# Patient Record
Sex: MALE | Race: WHITE | NOT HISPANIC OR LATINO
[De-identification: names, ages, dates, MRNs, and addresses within clinical notes are randomized per-mention and may not be internally consistent; named-entity substitution may affect disease eponyms.]

---

## 2017-04-07 ENCOUNTER — TRANSCRIPTION ENCOUNTER (OUTPATIENT)
Age: 37
End: 2017-04-07

## 2017-08-18 ENCOUNTER — EMERGENCY (EMERGENCY)
Facility: HOSPITAL | Age: 37
LOS: 1 days | Discharge: HOME | End: 2017-08-18
Attending: EMERGENCY MEDICINE
Payer: COMMERCIAL

## 2017-08-18 VITALS
TEMPERATURE: 98 F | DIASTOLIC BLOOD PRESSURE: 76 MMHG | HEART RATE: 56 BPM | SYSTOLIC BLOOD PRESSURE: 125 MMHG | RESPIRATION RATE: 18 BRPM | HEIGHT: 70 IN | WEIGHT: 199.96 LBS

## 2017-08-18 PROBLEM — Z00.00 ENCOUNTER FOR PREVENTIVE HEALTH EXAMINATION: Status: ACTIVE | Noted: 2017-08-18

## 2017-08-18 PROCEDURE — 99282 EMERGENCY DEPT VISIT SF MDM: CPT | Mod: 25

## 2017-08-18 PROCEDURE — 12011 RPR F/E/E/N/L/M 2.5 CM/<: CPT

## 2017-08-18 PROCEDURE — 99283 EMERGENCY DEPT VISIT LOW MDM: CPT | Mod: 25

## 2017-08-18 RX ORDER — TETANUS TOXOID, REDUCED DIPHTHERIA TOXOID AND ACELLULAR PERTUSSIS VACCINE, ADSORBED 5; 2.5; 8; 8; 2.5 [IU]/.5ML; [IU]/.5ML; UG/.5ML; UG/.5ML; UG/.5ML
0.5 SUSPENSION INTRAMUSCULAR ONCE
Qty: 0 | Refills: 0 | Status: DISCONTINUED | OUTPATIENT
Start: 2017-08-18 | End: 2017-08-18

## 2017-08-18 NOTE — ED STATDOCS - ATTENDING CONTRIBUTION TO CARE
I, Sandip Diallo, performed the initial face to face bedside interview with this patient regarding history of present illness, review of symptoms and relevant past medical, social and family history.  I completed an independent physical examination.  I was the initial provider who evaluated this patient. I have signed out the follow up of any pending tests (i.e. labs, radiological studies) to the ACP.  I have communicated the patient’s plan of care and disposition with the ACP.

## 2017-08-18 NOTE — ED STATDOCS - PROGRESS NOTE DETAILS
Lip laceration and repaired successfully. Pt tolerated procedure well. pt D/C in stable condition and will F/U for suture removal as discussed.

## 2017-08-18 NOTE — ED STATDOCS - CARE PLAN
Principal Discharge DX:	Lip laceration, initial encounter  Instructions for follow-up, activity and diet:	F/U for suture removal in 7 ays as discussed

## 2017-08-18 NOTE — ED STATDOCS - SKIN, MLM
laceration to the L upper lip crossing the vermilion border. 1.5 cm laceration to the L upper lip crossing the vermilion border.

## 2017-08-18 NOTE — ED STATDOCS - OBJECTIVE STATEMENT
35 y/o M w/ no significant PMHx presents to the ED c/o L upper lip laceration s/p cutting it with a splitting maul today. He received a tetanus shot within the last 10 years. Pt denies LOC or any other complaints at this time.

## 2017-09-04 ENCOUNTER — TRANSCRIPTION ENCOUNTER (OUTPATIENT)
Age: 37
End: 2017-09-04

## 2021-07-02 ENCOUNTER — TRANSCRIPTION ENCOUNTER (OUTPATIENT)
Age: 41
End: 2021-07-02

## 2022-02-17 ENCOUNTER — TRANSCRIPTION ENCOUNTER (OUTPATIENT)
Age: 42
End: 2022-02-17

## 2023-01-13 ENCOUNTER — NON-APPOINTMENT (OUTPATIENT)
Age: 43
End: 2023-01-13

## 2024-06-08 ENCOUNTER — EMERGENCY (EMERGENCY)
Facility: HOSPITAL | Age: 44
LOS: 1 days | Discharge: HOME | End: 2024-06-08
Attending: STUDENT IN AN ORGANIZED HEALTH CARE EDUCATION/TRAINING PROGRAM
Payer: COMMERCIAL

## 2024-06-08 VITALS
RESPIRATION RATE: 20 BRPM | WEIGHT: 200.62 LBS | SYSTOLIC BLOOD PRESSURE: 138 MMHG | HEIGHT: 70 IN | OXYGEN SATURATION: 98 % | DIASTOLIC BLOOD PRESSURE: 80 MMHG | HEART RATE: 77 BPM | TEMPERATURE: 98 F

## 2024-06-08 LAB
ALBUMIN SERPL ELPH-MCNC: 4.1 G/DL — SIGNIFICANT CHANGE UP (ref 3.3–5.2)
ALP SERPL-CCNC: 52 U/L — SIGNIFICANT CHANGE UP (ref 40–120)
ALT FLD-CCNC: 22 U/L — SIGNIFICANT CHANGE UP
ANION GAP SERPL CALC-SCNC: 14 MMOL/L — SIGNIFICANT CHANGE UP (ref 5–17)
AST SERPL-CCNC: 22 U/L — SIGNIFICANT CHANGE UP
BASOPHILS # BLD AUTO: 0.04 K/UL — SIGNIFICANT CHANGE UP (ref 0–0.2)
BASOPHILS NFR BLD AUTO: 0.4 % — SIGNIFICANT CHANGE UP (ref 0–2)
BILIRUB SERPL-MCNC: 0.6 MG/DL — SIGNIFICANT CHANGE UP (ref 0.4–2)
BUN SERPL-MCNC: 14.3 MG/DL — SIGNIFICANT CHANGE UP (ref 8–20)
CALCIUM SERPL-MCNC: 8.8 MG/DL — SIGNIFICANT CHANGE UP (ref 8.4–10.5)
CHLORIDE SERPL-SCNC: 100 MMOL/L — SIGNIFICANT CHANGE UP (ref 96–108)
CO2 SERPL-SCNC: 23 MMOL/L — SIGNIFICANT CHANGE UP (ref 22–29)
CREAT SERPL-MCNC: 0.75 MG/DL — SIGNIFICANT CHANGE UP (ref 0.5–1.3)
EGFR: 113 ML/MIN/1.73M2 — SIGNIFICANT CHANGE UP
EOSINOPHIL # BLD AUTO: 0.32 K/UL — SIGNIFICANT CHANGE UP (ref 0–0.5)
EOSINOPHIL NFR BLD AUTO: 2.8 % — SIGNIFICANT CHANGE UP (ref 0–6)
GLUCOSE SERPL-MCNC: 82 MG/DL — SIGNIFICANT CHANGE UP (ref 70–99)
HCT VFR BLD CALC: 39.1 % — SIGNIFICANT CHANGE UP (ref 39–50)
HGB BLD-MCNC: 14.2 G/DL — SIGNIFICANT CHANGE UP (ref 13–17)
IMM GRANULOCYTES NFR BLD AUTO: 0.2 % — SIGNIFICANT CHANGE UP (ref 0–0.9)
LYMPHOCYTES # BLD AUTO: 1.37 K/UL — SIGNIFICANT CHANGE UP (ref 1–3.3)
LYMPHOCYTES # BLD AUTO: 12.2 % — LOW (ref 13–44)
MCHC RBC-ENTMCNC: 31.6 PG — SIGNIFICANT CHANGE UP (ref 27–34)
MCHC RBC-ENTMCNC: 36.3 GM/DL — HIGH (ref 32–36)
MCV RBC AUTO: 86.9 FL — SIGNIFICANT CHANGE UP (ref 80–100)
MONOCYTES # BLD AUTO: 0.83 K/UL — SIGNIFICANT CHANGE UP (ref 0–0.9)
MONOCYTES NFR BLD AUTO: 7.4 % — SIGNIFICANT CHANGE UP (ref 2–14)
NEUTROPHILS # BLD AUTO: 8.68 K/UL — HIGH (ref 1.8–7.4)
NEUTROPHILS NFR BLD AUTO: 77 % — SIGNIFICANT CHANGE UP (ref 43–77)
PLATELET # BLD AUTO: 165 K/UL — SIGNIFICANT CHANGE UP (ref 150–400)
POTASSIUM SERPL-MCNC: 4.1 MMOL/L — SIGNIFICANT CHANGE UP (ref 3.5–5.3)
POTASSIUM SERPL-SCNC: 4.1 MMOL/L — SIGNIFICANT CHANGE UP (ref 3.5–5.3)
PROT SERPL-MCNC: 7 G/DL — SIGNIFICANT CHANGE UP (ref 6.6–8.7)
RBC # BLD: 4.5 M/UL — SIGNIFICANT CHANGE UP (ref 4.2–5.8)
RBC # FLD: 12.3 % — SIGNIFICANT CHANGE UP (ref 10.3–14.5)
SODIUM SERPL-SCNC: 137 MMOL/L — SIGNIFICANT CHANGE UP (ref 135–145)
WBC # BLD: 11.26 K/UL — HIGH (ref 3.8–10.5)
WBC # FLD AUTO: 11.26 K/UL — HIGH (ref 3.8–10.5)

## 2024-06-08 PROCEDURE — 73140 X-RAY EXAM OF FINGER(S): CPT | Mod: 26,RT

## 2024-06-08 PROCEDURE — 73130 X-RAY EXAM OF HAND: CPT | Mod: 26,RT

## 2024-06-08 PROCEDURE — 99223 1ST HOSP IP/OBS HIGH 75: CPT

## 2024-06-08 RX ORDER — CEFAZOLIN SODIUM 1 G
1000 VIAL (EA) INJECTION ONCE
Refills: 0 | Status: COMPLETED | OUTPATIENT
Start: 2024-06-08 | End: 2024-06-08

## 2024-06-08 RX ORDER — ACETAMINOPHEN 500 MG
650 TABLET ORAL EVERY 6 HOURS
Refills: 0 | Status: DISCONTINUED | OUTPATIENT
Start: 2024-06-08 | End: 2024-06-16

## 2024-06-08 RX ORDER — CEFAZOLIN SODIUM 1 G
1000 VIAL (EA) INJECTION ONCE
Refills: 0 | Status: DISCONTINUED | OUTPATIENT
Start: 2024-06-08 | End: 2024-06-08

## 2024-06-08 RX ORDER — CEFAZOLIN SODIUM 1 G
1000 VIAL (EA) INJECTION EVERY 8 HOURS
Refills: 0 | Status: DISCONTINUED | OUTPATIENT
Start: 2024-06-08 | End: 2024-06-09

## 2024-06-08 RX ORDER — DEXAMETHASONE 0.5 MG/5ML
10 ELIXIR ORAL ONCE
Refills: 0 | Status: DISCONTINUED | OUTPATIENT
Start: 2024-06-08 | End: 2024-06-08

## 2024-06-08 RX ORDER — KETOROLAC TROMETHAMINE 30 MG/ML
15 SYRINGE (ML) INJECTION EVERY 6 HOURS
Refills: 0 | Status: COMPLETED | OUTPATIENT
Start: 2024-06-08 | End: 2024-06-13

## 2024-06-08 RX ORDER — FAMOTIDINE 10 MG/ML
20 INJECTION INTRAVENOUS ONCE
Refills: 0 | Status: DISCONTINUED | OUTPATIENT
Start: 2024-06-08 | End: 2024-06-08

## 2024-06-08 RX ORDER — IBUPROFEN 200 MG
600 TABLET ORAL ONCE
Refills: 0 | Status: COMPLETED | OUTPATIENT
Start: 2024-06-08 | End: 2024-06-08

## 2024-06-08 RX ORDER — DIPHENHYDRAMINE HCL 50 MG
50 CAPSULE ORAL ONCE
Refills: 0 | Status: DISCONTINUED | OUTPATIENT
Start: 2024-06-08 | End: 2024-06-08

## 2024-06-08 RX ORDER — CEFAZOLIN SODIUM 1 G
1000 VIAL (EA) INJECTION EVERY 8 HOURS
Refills: 0 | Status: DISCONTINUED | OUTPATIENT
Start: 2024-06-08 | End: 2024-06-08

## 2024-06-08 RX ADMIN — Medication 600 MILLIGRAM(S): at 16:41

## 2024-06-08 RX ADMIN — Medication 600 MILLIGRAM(S): at 18:21

## 2024-06-08 RX ADMIN — Medication 50 MILLIGRAM(S): at 18:28

## 2024-06-08 RX ADMIN — Medication 100 MILLIGRAM(S): at 18:29

## 2024-06-08 RX ADMIN — Medication 1000 MILLIGRAM(S): at 22:29

## 2024-06-08 RX ADMIN — Medication 650 MILLIGRAM(S): at 18:28

## 2024-06-08 RX ADMIN — Medication 1000 MILLIGRAM(S): at 16:40

## 2024-06-08 RX ADMIN — Medication 100 MILLIGRAM(S): at 19:09

## 2024-06-08 NOTE — ED CDU PROVIDER INITIAL DAY NOTE - ATTENDING APP SHARED VISIT CONTRIBUTION OF CARE
I, Aurelio Ventura MD, performed the initial face to face bedside interview with this patient regarding history of present illness, review of symptoms and relevant past medical, social and family history.  I completed an independent physical examination.  I was the initial provider who evaluated this patient. I have signed out the follow up of any pending tests (i.e. labs, radiological studies) to the ACP.  I have communicated the patient’s plan of care and disposition with the ACP.

## 2024-06-08 NOTE — ED ADULT TRIAGE NOTE - CHIEF COMPLAINT QUOTE
Pt arrives ot ED c/o R hand thumb swelling / redness started on Thursday ,pt was placed on  Doxy on Friday , redness getting worse

## 2024-06-08 NOTE — CONSULT NOTE ADULT - SUBJECTIVE AND OBJECTIVE BOX
Pt Name: STEFANO HARRISON    MRN: [id number]      Patient is a 43y Male presenting to the emergency department with a chief complaint of right thumb erythema pain and swelling.  Patient states it began on Thursday on the palmar aspect of distal thumb and he went o urgent care and was given doxycycline  patient went to PCP the next day due to worsening pain and swelling.  PCP sent patient to ED for evaluation.      REVIEW OF SYSTEMS    General: Alert, responsive, in NAD    Skin: No rashes, no pruritis     Musculoskeletal: SEE HPI.    Neurological: No sensory or motor changes.         PAST MEDICAL & SURGICAL HISTORY:  PAST MEDICAL & SURGICAL HISTORY:  No pertinent past medical history      No significant past surgical history          Allergies: No Known Allergies      Medications: acetaminophen     Tablet .. 650 milliGRAM(s) Oral every 6 hours PRN  ceFAZolin  Injectable. 1000 milliGRAM(s) IV Push every 8 hours  doxycycline IVPB 100 milliGRAM(s) IV Intermittent every 12 hours  ketorolac   Injectable 15 milliGRAM(s) IV Push every 6 hours  predniSONE   Tablet 50 milliGRAM(s) Oral daily      FAMILY HISTORY:  : non-contributory    Social History:     Ambulation: Walking independently [ ] With Cane [ ] With Walker [ ]  Bedbound [ ]                           14.2   11.26 )-----------( 165      ( 08 Jun 2024 16:10 )             39.1       06-08    137  |  100  |  14.3  ----------------------------<  82  4.1   |  23.0  |  0.75    Ca    8.8      08 Jun 2024 16:10    TPro  7.0  /  Alb  4.1  /  TBili  0.6  /  DBili  x   /  AST  22  /  ALT  22  /  AlkPhos  52  06-08      Vital Signs Last 24 Hrs  T(C): 36.7 (08 Jun 2024 15:02), Max: 36.7 (08 Jun 2024 15:02)  T(F): 98 (08 Jun 2024 15:02), Max: 98 (08 Jun 2024 15:02)  HR: 77 (08 Jun 2024 15:02) (77 - 77)  BP: 138/80 (08 Jun 2024 15:02) (138/80 - 138/80)  BP(mean): --  RR: 20 (08 Jun 2024 15:02) (20 - 20)  SpO2: 98% (08 Jun 2024 15:02) (98% - 98%)    Parameters below as of 08 Jun 2024 15:02  Patient On (Oxygen Delivery Method): room air        Daily Height in cm: 177.8 (08 Jun 2024 15:02)    Daily       PHYSICAL EXAM:      Appearance: Alert, responsive, in no acute distress.    Neurological: Sensation is grossly intact to light touch. No focal deficits or weaknesses found.    Skin: no rash on visible skin. Skin is clean, dry and intact. No bleeding. No abrasions. No ulcerations.    Vascular: 2+ distal pulses. Cap refill < 2 sec. No signs of venous insufficiency or stasis. No extremity ulcerations. No cyanosis.    Musculoskeletal:         Left Upper Extremity: Clavicle: NTTP. Shoulder: NTTP, FROM. Abduction/adduction/flexion/extension intact. Elbow: NTTP, FROM. EE/EF intact. Wrist: NTTP, FROM. WE/WF intact. Hand: NTTP throughout, FROM. Abduction/adduction/flexion/extension of digits 1-5 intact. Compartments soft compressible. Sensation intact to light touch.        Right Upper Extremity: Clavicle: NTTP. Shoulder: NTTP, FROM. Abduction/adduction/flexion/extension intact. Elbow: NTTP, FROM. EE/EF intact. Wrist: NTTP, FROM. WE/WF intact. Hand: NTTP throughout, FROM. Abduction/adduction/flexion/extension of digits 1-5 intact. Compartments soft compressible. Sensation intact to light touch.        Left Lower Extremity: Hip: NTTP, FROM. HF/HE intact. Knee: NTTP, FROM. KE/KF intact. Ankle: NTTP, FROM. DF/PF/EHL/FHL intact. Compartments soft compressible. Sensation intact to light touch.        Right Lower Extremity: Hip: NTTP, FROM. HF/HE intact. Knee: NTTP, FROM. KE/KF intact. Ankle: NTTP, FROM. DF/PF/EHL/FHL intact. Compartments soft compressible. Sensation intact to light touch.     Imaging Studies:    A/P:  Pt is a  43y Male with    found to have    PLAN:   * NPO for OR tomorrow  * IV fluids ordered and to start once NPO  * Pre-operative ABX ordered  *Routine daily anticoagulation held for OR  * Single dose anticoaguation ordered  * Medical clearance requested for procedure  * Bed rest Pt Name: STEFANO HARRISON    MRN: [id number]      Patient is a 43y Male presenting to the emergency department with a chief complaint of right thumb erythema pain and swelling.  Patient states it began on Thursday on the palmar aspect of distal thumb and he went o urgent care and was given doxycycline  patient went to PCP the next day due to worsening pain and swelling.  PCP sent patient to ED for evaluation.  ED performed I&D right thumb nailed with bloody discharge.  Denies any other orthopedic complaint.      REVIEW OF SYSTEMS    General: Alert, responsive, in NAD    Skin: No rashes, no pruritis     Musculoskeletal: SEE HPI.    Neurological: No sensory or motor changes.         PAST MEDICAL & SURGICAL HISTORY:  PAST MEDICAL & SURGICAL HISTORY:  No pertinent past medical history      No significant past surgical history          Allergies: No Known Allergies      Medications: acetaminophen     Tablet .. 650 milliGRAM(s) Oral every 6 hours PRN  ceFAZolin  Injectable. 1000 milliGRAM(s) IV Push every 8 hours  doxycycline IVPB 100 milliGRAM(s) IV Intermittent every 12 hours  ketorolac   Injectable 15 milliGRAM(s) IV Push every 6 hours  predniSONE   Tablet 50 milliGRAM(s) Oral daily      FAMILY HISTORY:  : non-contributory    Social History:                             14.2   11.26 )-----------( 165      ( 08 Jun 2024 16:10 )             39.1       06-08    137  |  100  |  14.3  ----------------------------<  82  4.1   |  23.0  |  0.75    Ca    8.8      08 Jun 2024 16:10    TPro  7.0  /  Alb  4.1  /  TBili  0.6  /  DBili  x   /  AST  22  /  ALT  22  /  AlkPhos  52  06-08      Vital Signs Last 24 Hrs  T(C): 36.7 (08 Jun 2024 15:02), Max: 36.7 (08 Jun 2024 15:02)  T(F): 98 (08 Jun 2024 15:02), Max: 98 (08 Jun 2024 15:02)  HR: 77 (08 Jun 2024 15:02) (77 - 77)  BP: 138/80 (08 Jun 2024 15:02) (138/80 - 138/80)  BP(mean): --  RR: 20 (08 Jun 2024 15:02) (20 - 20)  SpO2: 98% (08 Jun 2024 15:02) (98% - 98%)    Parameters below as of 08 Jun 2024 15:02  Patient On (Oxygen Delivery Method): room air        Daily Height in cm: 177.8 (08 Jun 2024 15:02)    Daily       PHYSICAL EXAM:      Appearance: Alert, responsive, in no acute distress.    Neurological: Sensation is grossly intact to light touch. No focal deficits or weaknesses found.    Skin: no rash on visible skin. Skin is clean, dry and intact. No bleeding. No abrasions. No ulcerations.    Vascular: 2+ distal pulses. Cap refill < 2 sec. No signs of venous insufficiency or stasis. No extremity ulcerations. No cyanosis.    Musculoskeletal:         Left Upper Extremity: Clavicle: NTTP. Shoulder: NTTP, FROM. Abduction/adduction/flexion/extension intact. Elbow: NTTP, FROM. EE/EF intact. Wrist: NTTP, FROM. WE/WF intact. Hand: NTTP throughout, FROM. Abduction/adduction/flexion/extension of digits 1-5 intact. Compartments soft compressible. Sensation intact to light touch.        Right Upper Extremity: Clavicle: NTTP. Shoulder: NTTP, FROM. Abduction/adduction/flexion/extension intact. Elbow: NTTP, FROM. EE/EF intact. Wrist: NTTP, FROM. WE/WF intact. Hand: TTP throughout thumb, significant welling, erythema thumb, patient unable to perform ROM of thumb due to pain and swelling. Ecchymosis under nailbed at site of I&D Abduction/adduction/flexion/extension of digits 2-5 intact. Compartments soft compressible. Sensation intact to light touch.        Left Lower Extremity: Hip: NTTP, FROM. HF/HE intact. Knee: NTTP, FROM. KE/KF intact. Ankle: NTTP, FROM. DF/PF/EHL/FHL intact. Compartments soft compressible. Sensation intact to light touch.        Right Lower Extremity: Hip: NTTP, FROM. HF/HE intact. Knee: NTTP, FROM. KE/KF intact. Ankle: NTTP, FROM. DF/PF/EHL/FHL intact. Compartments soft compressible. Sensation intact to light touch.     Imaging Studies:  Preliminary read right thumb x ray 3 views  - no acute fracture noted     A/P:  Pt is a  43y Male with right thumb cellulitis     PLAN:   * Pain Control   * ID consult for ABX management   * Ancef and vancomycin until ID reccs.   * NWB right hand   * Recommend observations   * Will follow labs   * Waiting for final x ray read   * Case discussed with Dr Pedraza

## 2024-06-08 NOTE — CONSULT NOTE ADULT - NS ATTEND AMEND GEN_ALL_CORE FT
Agree with PA note and plan a written - continue IV antibiotics and will need to stay for observation and failed a few doses of antibiotics and worsening, WBC elevated and I and D completed by ED with no improvement.  Ortho to follow daily, please call with any issues.

## 2024-06-08 NOTE — ED CDU PROVIDER INITIAL DAY NOTE - PHYSICAL EXAMINATION
Gen: No acute distress, non toxic  Head: NCAT  Eyes: pink conjunctivae, EOMI, PERRL  Neuro: A&O x 3, sensorimotor intact without deficits   MSK: right thumb edematous and swollen, with induration and fluctuance present over proximal aspect of nailbed, limited range of motion of right thumb secondary to pain and swelling, no streaking/crepitus. no pain with passive extension.   Vasc: Radial pulses 2+ b/l  Skin: see MSK above

## 2024-06-08 NOTE — ED CDU PROVIDER INITIAL DAY NOTE - CLINICAL SUMMARY MEDICAL DECISION MAKING FREE TEXT BOX
42yo M no PMHx presenting to ED with swelling and pain to right thumb x 2 days. Has taken 2 doses of doxycycline thus far.  Worsened pain and low grade fever so presented to ED. Pt recevied IV cefazolin while in ED, labs grossly unremarkable, WBC 11.2k, blood cultures pending. XR unremarkable, no fx, FB or subcutaneous air. I&D attempted without significant drainage, culture sent. Pt evaluated by hand/ortho, recommending IV abx.

## 2024-06-08 NOTE — ED PROVIDER NOTE - PHYSICAL EXAMINATION
Gen: NAD, AOx3  Head: NCAT  HEENT: oral mucosa moist, normal conjunctiva, oropharynx clear without exudate or erythema  Lung: CTAB, no respiratory distress, no wheezing, rales, rhonchi  CV: normal s1/s2, rrr, no murmurs, Normal perfusion, pulses 2+ throughout  Abd: soft, NTND  MSK: right thumb edematous and swollen, with induration and fluctuance present over proximal aspect of nailbed, limited range of motion of right thumb secondary to pain and swelling, no streaking/ crepitus  Neuro: CN II-XII grossly intact, No focal neurologic deficits  Skin: see MSK above  Psych: normal affect

## 2024-06-08 NOTE — ED ADULT NURSE NOTE - NSFALLUNIVINTERV_ED_ALL_ED
Bed/Stretcher in lowest position, wheels locked, appropriate side rails in place/Call bell, personal items and telephone in reach/Instruct patient to call for assistance before getting out of bed/chair/stretcher/Non-slip footwear applied when patient is off stretcher/[place] to call system/Physically safe environment - no spills, clutter or unnecessary equipment/Purposeful proactive rounding/Room/bathroom lighting operational, light cord in reach

## 2024-06-08 NOTE — ED CDU PROVIDER INITIAL DAY NOTE - OBJECTIVE STATEMENT
42yo M no PMHx presenting to ED with swelling and pain to right thumb x 2 days. Patient states that this started 2 days ago, felt that he may have gotten some metal shavings into the thumb. He went to urgent care last night, was given doxycycline, and told to come to the ED if his symptoms are worse. Has taken 2 doses of doxycycline thus far.  He states that he cannot sleep all night last night because of the pain, had fever, Tmax 100, went to primary care physician today and was referred to the ED.  Patient states that he is unable to bend the thumb secondary to pain and swelling. Pt recevied IV cefazolin while in ED, labs grossly unremarkable, WBC 11.2k, blood cultures pending. XR unremarkable, no fx, FB or subcutaneous air. I&D attempted without significant drainage, culture sent. Pt evaluated by hand/ortho, recommending IV abx. Pt denies numbness, weakness, pain with extension, hx DM, trauma. Pt is right hand dominant.

## 2024-06-08 NOTE — ED PROVIDER NOTE - OBJECTIVE STATEMENT
43-year-old male with no past medical history presenting with swelling and pain to the  thumb on right hand.  Patient states that this started 2 days ago, felt that he had gotten some  metal shavings into the thumb.  He went to urgent care last night, was given doxycycline, and told to come to the ED if his symptoms are worse.  He states that he cannot sleep all night last night because of the pain, had fever, Tmax 100, went to primary care physician today and was referred to the ED.  Patient states that he is unable to bend the thumb secondary to pain and swelling.

## 2024-06-08 NOTE — ED PROVIDER NOTE - WR ORDER DATE AND TIME 1
Pharmacy requests refill of amlodipine 5 mg  Last filled 3/2/2020  Last OV 2/25/2020  Rx sent   08-Jun-2024 16:05

## 2024-06-08 NOTE — ED PROVIDER NOTE - CLINICAL SUMMARY MEDICAL DECISION MAKING FREE TEXT BOX
patient presenting with felon to right thumb, attempted to drain paronychia at proximal nailbed site with bloody drainage.  plan to treat with IV Ancef, x-ray was reviewed, no acute findings.  Hand to see patient, will place in observation for IV antibiotics.

## 2024-06-09 LAB
ALBUMIN SERPL ELPH-MCNC: 3.9 G/DL — SIGNIFICANT CHANGE UP (ref 3.3–5.2)
ALP SERPL-CCNC: 54 U/L — SIGNIFICANT CHANGE UP (ref 40–120)
ALT FLD-CCNC: 18 U/L — SIGNIFICANT CHANGE UP
ANION GAP SERPL CALC-SCNC: 12 MMOL/L — SIGNIFICANT CHANGE UP (ref 5–17)
AST SERPL-CCNC: 17 U/L — SIGNIFICANT CHANGE UP
BASOPHILS # BLD AUTO: 0.01 K/UL — SIGNIFICANT CHANGE UP (ref 0–0.2)
BASOPHILS NFR BLD AUTO: 0.1 % — SIGNIFICANT CHANGE UP (ref 0–2)
BILIRUB SERPL-MCNC: 0.4 MG/DL — SIGNIFICANT CHANGE UP (ref 0.4–2)
BUN SERPL-MCNC: 16.3 MG/DL — SIGNIFICANT CHANGE UP (ref 8–20)
CALCIUM SERPL-MCNC: 8.8 MG/DL — SIGNIFICANT CHANGE UP (ref 8.4–10.5)
CHLORIDE SERPL-SCNC: 105 MMOL/L — SIGNIFICANT CHANGE UP (ref 96–108)
CO2 SERPL-SCNC: 20 MMOL/L — LOW (ref 22–29)
CREAT SERPL-MCNC: 0.83 MG/DL — SIGNIFICANT CHANGE UP (ref 0.5–1.3)
EGFR: 110 ML/MIN/1.73M2 — SIGNIFICANT CHANGE UP
EOSINOPHIL # BLD AUTO: 0 K/UL — SIGNIFICANT CHANGE UP (ref 0–0.5)
EOSINOPHIL NFR BLD AUTO: 0 % — SIGNIFICANT CHANGE UP (ref 0–6)
GLUCOSE SERPL-MCNC: 145 MG/DL — HIGH (ref 70–99)
GRAM STN FLD: SIGNIFICANT CHANGE UP
HCT VFR BLD CALC: 39.1 % — SIGNIFICANT CHANGE UP (ref 39–50)
HCV AB S/CO SERPL IA: 0.13 S/CO — SIGNIFICANT CHANGE UP (ref 0–0.99)
HCV AB SERPL-IMP: SIGNIFICANT CHANGE UP
HGB BLD-MCNC: 14 G/DL — SIGNIFICANT CHANGE UP (ref 13–17)
IMM GRANULOCYTES NFR BLD AUTO: 0.4 % — SIGNIFICANT CHANGE UP (ref 0–0.9)
LYMPHOCYTES # BLD AUTO: 0.93 K/UL — LOW (ref 1–3.3)
LYMPHOCYTES # BLD AUTO: 9.3 % — LOW (ref 13–44)
MCHC RBC-ENTMCNC: 31 PG — SIGNIFICANT CHANGE UP (ref 27–34)
MCHC RBC-ENTMCNC: 35.8 GM/DL — SIGNIFICANT CHANGE UP (ref 32–36)
MCV RBC AUTO: 86.5 FL — SIGNIFICANT CHANGE UP (ref 80–100)
MONOCYTES # BLD AUTO: 0.69 K/UL — SIGNIFICANT CHANGE UP (ref 0–0.9)
MONOCYTES NFR BLD AUTO: 6.9 % — SIGNIFICANT CHANGE UP (ref 2–14)
NEUTROPHILS # BLD AUTO: 8.29 K/UL — HIGH (ref 1.8–7.4)
NEUTROPHILS NFR BLD AUTO: 83.3 % — HIGH (ref 43–77)
PLATELET # BLD AUTO: 176 K/UL — SIGNIFICANT CHANGE UP (ref 150–400)
POTASSIUM SERPL-MCNC: 4.1 MMOL/L — SIGNIFICANT CHANGE UP (ref 3.5–5.3)
POTASSIUM SERPL-SCNC: 4.1 MMOL/L — SIGNIFICANT CHANGE UP (ref 3.5–5.3)
PROT SERPL-MCNC: 7 G/DL — SIGNIFICANT CHANGE UP (ref 6.6–8.7)
RBC # BLD: 4.52 M/UL — SIGNIFICANT CHANGE UP (ref 4.2–5.8)
RBC # FLD: 12.2 % — SIGNIFICANT CHANGE UP (ref 10.3–14.5)
SODIUM SERPL-SCNC: 137 MMOL/L — SIGNIFICANT CHANGE UP (ref 135–145)
SPECIMEN SOURCE: SIGNIFICANT CHANGE UP
WBC # BLD: 9.96 K/UL — SIGNIFICANT CHANGE UP (ref 3.8–10.5)
WBC # FLD AUTO: 9.96 K/UL — SIGNIFICANT CHANGE UP (ref 3.8–10.5)

## 2024-06-09 PROCEDURE — 99232 SBSQ HOSP IP/OBS MODERATE 35: CPT

## 2024-06-09 PROCEDURE — 99205 OFFICE O/P NEW HI 60 MIN: CPT

## 2024-06-09 RX ORDER — TETANUS TOXOID, REDUCED DIPHTHERIA TOXOID AND ACELLULAR PERTUSSIS VACCINE, ADSORBED 5; 2.5; 8; 8; 2.5 [IU]/.5ML; [IU]/.5ML; UG/.5ML; UG/.5ML; UG/.5ML
0.5 SUSPENSION INTRAMUSCULAR ONCE
Refills: 0 | Status: COMPLETED | OUTPATIENT
Start: 2024-06-09 | End: 2024-06-10

## 2024-06-09 RX ORDER — CEFAZOLIN SODIUM 1 G
2000 VIAL (EA) INJECTION EVERY 8 HOURS
Refills: 0 | Status: DISCONTINUED | OUTPATIENT
Start: 2024-06-09 | End: 2024-06-16

## 2024-06-09 RX ADMIN — Medication 650 MILLIGRAM(S): at 19:14

## 2024-06-09 RX ADMIN — Medication 100 MILLIGRAM(S): at 19:26

## 2024-06-09 RX ADMIN — Medication 100 MILLIGRAM(S): at 06:10

## 2024-06-09 RX ADMIN — Medication 100 MILLIGRAM(S): at 19:10

## 2024-06-09 RX ADMIN — Medication 100 MILLIGRAM(S): at 20:10

## 2024-06-09 RX ADMIN — Medication 2000 MILLIGRAM(S): at 22:30

## 2024-06-09 RX ADMIN — Medication 50 MILLIGRAM(S): at 06:10

## 2024-06-09 RX ADMIN — Medication 650 MILLIGRAM(S): at 19:26

## 2024-06-09 RX ADMIN — Medication 650 MILLIGRAM(S): at 20:10

## 2024-06-09 RX ADMIN — Medication 2000 MILLIGRAM(S): at 13:48

## 2024-06-09 RX ADMIN — Medication 1000 MILLIGRAM(S): at 06:09

## 2024-06-09 NOTE — CONSULT NOTE ADULT - SUBJECTIVE AND OBJECTIVE BOX
[facility]  INFECTIOUS DISEASES at [place] / [place] / [place]  =======================================================                               Juan Arndt MD#   Conchita Jon MD*                             Meredith Mayers MD*   Sasha Hernandez MD*                              Professor Emeritus:  Dr Evangelist Encarnacion MD^            Diplomates American Board of Internal Medicine & Infectious Diseases                # [place] [employer] Office - Appt - Tel  [phone] Fax [phone]                * [place] Office - Appt - Tel [phone] Fax [phone]               ^ [place] Office - Appt - Tel  [phone] Fax [phone]                                  Hospital Consult line:  [phone]  =======================================================      MRN-[id number]  STEFANO HARRISON    CC: Patient is a 43y old  Male who presents with a chief complaint of thumb infection    43y  Male with no past medical history. Patient presented to the ER on 6/8 with swelling and pain to the  thumb on right hand. Patient states that this started 2 days prior to presentation. Patient reports he felt that he had gotten some  metal shavings into the thumb.  He went to urgent care, was given doxycycline, and told to come to the ED if his symptoms are worse.  He came to the ER on 6/8 and underwent an I&D by the ED. Started on Doxycycline and Cefazolin. Seen by hand surgery. ID input requested.       Past Medical & Surgical Hx:  No pertinent past medical history  No significant past surgical history      Social Hx:  Denies smoking       FAMILY HISTORY:  Denies FH of medical problems of mother or father       Allergies  No Known Allergies       REVIEW OF SYSTEMS:  CONSTITUTIONAL:  No Fever or chills  HEENT:  No diplopia or blurred vision.  No earache, sore throat or runny nose.  CARDIOVASCULAR:  No chest pain  RESPIRATORY:  No cough, shortness of breath  GASTROINTESTINAL:  No nausea, vomiting or diarrhea.  GENITOURINARY:  No dysuria, frequency or urgency. No Blood in urine  MUSCULOSKELETAL:  no joint aches, no muscle pain  SKIN:  No change in skin, hair or nails.  NEUROLOGIC:  No Headaches      Physical Exam:  GEN: NAD  HEENT: normocephalic and atraumatic. EOMI. PERRL.    NECK: Supple.   LUNGS: CTA B/L.  HEART: RRR  ABDOMEN: Soft, NT, ND.  +BS.    : No CVA tenderness  EXTREMITIES: Without  edema.  MSK: No joint swelling  NEUROLOGIC: No Focal Deficits   SKIN: Thumb in dressing, has ROM now      Height (cm): 177.8 (06-08 @ 15:02)  Weight (kg): 91 (06-08 @ 15:02)  BMI (kg/m2): 28.8 (06-08 @ 15:02)  BSA (m2): 2.09 (06-08 @ 15:02)      Vitals:  T(F): 98.7 (09 Jun 2024 07:10), Max: 98.9 (08 Jun 2024 18:21)  HR: 83 (09 Jun 2024 07:10)  BP: 135/81 (09 Jun 2024 07:10)  RR: 18 (09 Jun 2024 07:10)  SpO2: 98% (09 Jun 2024 07:10) (98% - 99%)  temp max in last 48H T(F): , Max: 98.9 (06-08-24 @ 18:21)    Current Antibiotics:  ceFAZolin  Injectable. 1000 milliGRAM(s) IV Push every 8 hours  doxycycline IVPB 100 milliGRAM(s) IV Intermittent every 12 hours    Other medications:  ketorolac   Injectable 15 milliGRAM(s) IV Push every 6 hours                 14.0   9.96  )-----------( 176      ( 09 Jun 2024 05:46 )             39.1     06-09    137  |  105  |  16.3  ----------------------------<  145<H>  4.1   |  20.0<L>  |  0.83    Ca    8.8      09 Jun 2024 05:46    TPro  7.0  /  Alb  3.9  /  TBili  0.4  /  DBili  x   /  AST  17  /  ALT  18  /  AlkPhos  54  06-09    RECENT CULTURES:  06-08 @ 16:28 .Abscess Arm - Right     No polymorphonuclear leukocytes seen per low power field  No organisms seen per oil power field      WBC Count: 9.96 K/uL (06-09-24 @ 05:46)  WBC Count: 11.26 K/uL (06-08-24 @ 16:10)    Creatinine: 0.83 mg/dL (06-09-24 @ 05:46)  Creatinine: 0.75 mg/dL (06-08-24 @ 16:10)      < from: Xray Hand 3 Views, Right (06.08.24 @ 16:36) >  ACC: [id number] EXAM:  XR HAND MIN 3 VIEWS RT   ORDERED BY: CLARI JONES     PROCEDURE DATE:  06/08/2024          INTERPRETATION:  XR HAND 3 VIEWS RIGHT    INDICATION:  R hand swelling.    TECHNIQUE:  AP, oblique and lateral views of the right hand were obtained.    FINDINGS:  There is no evidence of acute fracture. The joint spaces and   growth plates are unremarkable. There is mild soft tissue swelling at the   base of the right thumb.    IMPRESSION:  No fracture of the right hand    --- End of Report ---    < end of copied text >

## 2024-06-09 NOTE — CONSULT NOTE ADULT - ASSESSMENT
43y  Male with no past medical history. Patient presented to the ER on 6/8 with swelling and pain to the  thumb on right hand. Patient states that this started 2 days prior to presentation. Patient reports he felt that he had gotten some  metal shavings into the thumb.  He went to urgent care, was given doxycycline, and told to come to the ED if his symptoms are worse.  He came to the ER on 6/8 and underwent an I&D by the ED. Started on Doxycycline and Cefazolin. Seen by hand surgery. ID input requested.      Rt thumb infection  Leukocytosis       - Blood cultures pending   - Abscess cultures pending  - Hand Xr with no acute findings  - Unclear cause of infection, ? metal shavings  - Will give TDaP vaccine  - Increased dose of Cefazolin  - Continue Doxycycline   - Follow up cultures  - Trend Fever  - Trend WBC      Thank you for allowing me to participate in the care of your patient.   Will Follow    Discussed treatment plan with: Orthopedics

## 2024-06-09 NOTE — PROGRESS NOTE ADULT - SUBJECTIVE AND OBJECTIVE BOX
Pt Name: STEFANO HARRISON  MRN: [id number]    Procedure:  Surgeon:     Patient is a 43y Male being followed for right thumb paronychia s/p I&D yesterday. Patient seen and examined at bedside. Patient is doing well and feels much better today. Pain is controlled with the prescribed medication. Reports improved ROM. Denies CP, SOB, N/V. No other orthopedic complaints.        Vital Signs Last 24 Hrs  T(C): 37.1 (09 Jun 2024 07:10), Max: 37.2 (08 Jun 2024 18:21)  T(F): 98.7 (09 Jun 2024 07:10), Max: 98.9 (08 Jun 2024 18:21)  HR: 83 (09 Jun 2024 07:10) (70 - 83)  BP: 135/81 (09 Jun 2024 07:10) (135/81 - 141/87)  BP(mean): --  RR: 18 (09 Jun 2024 07:10) (18 - 20)  SpO2: 98% (09 Jun 2024 07:10) (98% - 99%)    Parameters below as of 09 Jun 2024 07:10  Patient On (Oxygen Delivery Method): room air      Daily Height in cm: 177.8 (08 Jun 2024 15:02)    Daily                           14.0   9.96  )-----------( 176      ( 09 Jun 2024 05:46 )             39.1     06-09    137  |  105  |  16.3  ----------------------------<  145<H>  4.1   |  20.0<L>  |  0.83    Ca    8.8      09 Jun 2024 05:46    TPro  7.0  /  Alb  3.9  /  TBili  0.4  /  DBili  x   /  AST  17  /  ALT  18  /  AlkPhos  54  06-09      PHYSICAL EXAM:    Appearance: Alert, responsive, in no acute distress.    Musculoskeletal:       Right Upper Extremity: +erythema/ecchymosis to distal thumb surrounding nail bed, incision site with no drainage or dischage. No bleeding. Sensation is grossly intact to light touch, +tingling to tip of thumb. Improved ROM of thumb now able to flex DIP normally now compared to minimal movement yesterday. thumbs up/OK sign/opposition intact. Radial pulses 2+. Cap refill < 2 seconds. No cyanosis.       A/P:  Pt is a  43y Male with right thumb paronychia s/p I&D POD#1. Improvement since yesterday.    PLAN:     * final plan pending discussion with Dr. Greene  * continue ABX  * Pain control  * Elevation

## 2024-06-09 NOTE — ED CDU PROVIDER SUBSEQUENT DAY NOTE - CLINICAL SUMMARY MEDICAL DECISION MAKING FREE TEXT BOX
42yo M no PMHx presenting to ED with swelling and pain to right thumb x 2 days. Has taken 2 doses of doxycycline thus far.  Worsened pain and low grade fever so presented to ED. Pt recevied IV cefazolin while in ED, labs grossly unremarkable, WBC 11.2k, blood cultures pending. XR unremarkable, no fx, FB or subcutaneous air. I&D attempted without significant drainage, culture sent. Pt evaluated by hand/ortho, recommending IV abx

## 2024-06-10 VITALS
SYSTOLIC BLOOD PRESSURE: 117 MMHG | DIASTOLIC BLOOD PRESSURE: 74 MMHG | TEMPERATURE: 98 F | OXYGEN SATURATION: 97 % | HEART RATE: 64 BPM | RESPIRATION RATE: 18 BRPM

## 2024-06-10 PROCEDURE — 96375 TX/PRO/DX INJ NEW DRUG ADDON: CPT

## 2024-06-10 PROCEDURE — 73140 X-RAY EXAM OF FINGER(S): CPT

## 2024-06-10 PROCEDURE — 87077 CULTURE AEROBIC IDENTIFY: CPT

## 2024-06-10 PROCEDURE — 99215 OFFICE O/P EST HI 40 MIN: CPT

## 2024-06-10 PROCEDURE — 96365 THER/PROPH/DIAG IV INF INIT: CPT | Mod: XU

## 2024-06-10 PROCEDURE — 90471 IMMUNIZATION ADMIN: CPT

## 2024-06-10 PROCEDURE — 96366 THER/PROPH/DIAG IV INF ADDON: CPT

## 2024-06-10 PROCEDURE — 90715 TDAP VACCINE 7 YRS/> IM: CPT

## 2024-06-10 PROCEDURE — 87205 SMEAR GRAM STAIN: CPT

## 2024-06-10 PROCEDURE — 80053 COMPREHEN METABOLIC PANEL: CPT

## 2024-06-10 PROCEDURE — 86803 HEPATITIS C AB TEST: CPT

## 2024-06-10 PROCEDURE — 99284 EMERGENCY DEPT VISIT MOD MDM: CPT | Mod: 25

## 2024-06-10 PROCEDURE — 36415 COLL VENOUS BLD VENIPUNCTURE: CPT

## 2024-06-10 PROCEDURE — 85025 COMPLETE CBC W/AUTO DIFF WBC: CPT

## 2024-06-10 PROCEDURE — 87070 CULTURE OTHR SPECIMN AEROBIC: CPT

## 2024-06-10 PROCEDURE — 20610 DRAIN/INJ JOINT/BURSA W/O US: CPT | Mod: F5

## 2024-06-10 PROCEDURE — 87040 BLOOD CULTURE FOR BACTERIA: CPT

## 2024-06-10 PROCEDURE — 73130 X-RAY EXAM OF HAND: CPT

## 2024-06-10 PROCEDURE — 96376 TX/PRO/DX INJ SAME DRUG ADON: CPT | Mod: XU

## 2024-06-10 PROCEDURE — 99238 HOSP IP/OBS DSCHRG MGMT 30/<: CPT

## 2024-06-10 PROCEDURE — G0378: CPT

## 2024-06-10 RX ORDER — IBUPROFEN 200 MG
1 TABLET ORAL
Qty: 15 | Refills: 0
Start: 2024-06-10

## 2024-06-10 RX ORDER — LINEZOLID 600 MG/300ML
600 INJECTION, SOLUTION INTRAVENOUS EVERY 12 HOURS
Refills: 0 | Status: DISCONTINUED | OUTPATIENT
Start: 2024-06-10 | End: 2024-06-16

## 2024-06-10 RX ADMIN — Medication 2000 MILLIGRAM(S): at 14:43

## 2024-06-10 RX ADMIN — LINEZOLID 600 MILLIGRAM(S): 600 INJECTION, SOLUTION INTRAVENOUS at 14:43

## 2024-06-10 RX ADMIN — Medication 2000 MILLIGRAM(S): at 06:51

## 2024-06-10 RX ADMIN — Medication 100 MILLIGRAM(S): at 06:51

## 2024-06-10 RX ADMIN — Medication 15 MILLIGRAM(S): at 12:01

## 2024-06-10 RX ADMIN — TETANUS TOXOID, REDUCED DIPHTHERIA TOXOID AND ACELLULAR PERTUSSIS VACCINE, ADSORBED 0.5 MILLILITER(S): 5; 2.5; 8; 8; 2.5 SUSPENSION INTRAMUSCULAR at 12:01

## 2024-06-10 RX ADMIN — Medication 650 MILLIGRAM(S): at 06:57

## 2024-06-10 NOTE — ED CDU PROVIDER SUBSEQUENT DAY NOTE - PROGRESS NOTE DETAILS
see the patient at the bedside in the morning states initially had decreased the pain. this morning he feels more pain on mid aspect of the right mid palm of the right thumb palmar. called orthopedic for further evaluation.  White count trending down. spoke with the orthopedic PA pending attending evaluation make the patient aware. spoke with the ID as well who recommended patient if cleared by the orthopedic can be discharged on the  linezolid of  7 days only Patient seen by attending orthopedic Dr. Ortiz   who recommended doxycycline. despite recommendation linezolid by the infectious disease doctor.  states she will contact the and ID  In regards to change

## 2024-06-10 NOTE — ED CDU PROVIDER DISPOSITION NOTE - CLINICAL COURSE
42yo M no PMHx presenting to ED with swelling and pain to right thumb x 2 days. Patient states that this started 2 days ago, felt that he may have gotten some metal shavings into the thumb. He went to urgent care last night, was given doxycycline, and told to come to the ED if his symptoms are worse. Has taken 2 doses of doxycycline thus far.  He states that he cannot sleep all night last night because of the pain, had fever, Tmax 100, went to primary care physician today and was referred to the ED.  Patient states that he is unable to bend the thumb secondary to pain and swelling. Pt recevied IV cefazolin while in ED, labs grossly unremarkable, WBC 11.2k, blood cultures pending. XR unremarkable, no fx, FB or subcutaneous air. I&D attempted without significant drainage, culture sent. Pt evaluated by hand/ortho, recommending IV abx. Pt denies numbness, weakness, pain with extension, hx DM, trauma. Pt is right hand dominant. patient had some improvement initially but then the pain begins in am.. patient seen today by infectious disease doctor who changed to doxycycline to linezolid. patient seen by the orthopedic attending Dr. brumfield And she like to change antibiotics and continue doxycycline. per attending she will make the ID aware of the changes. 44yo M no PMHx presenting to ED with swelling and pain to right thumb x 2 days. Patient states that this started 2 days ago, felt that he may have gotten some metal shavings into the thumb. He went to urgent care last night, was given doxycycline, and told to come to the ED if his symptoms are worse. Has taken 2 doses of doxycycline thus far.  He states that he cannot sleep all night last night because of the pain, had fever, Tmax 100, went to primary care physician today and was referred to the ED.  Patient states that he is unable to bend the thumb secondary to pain and swelling. Pt recevied IV cefazolin while in ED, labs grossly unremarkable, WBC 11.2k, blood cultures pending. XR unremarkable, no fx, FB or subcutaneous air. I&D attempted without significant drainage, culture sent. Pt evaluated by hand/ortho, recommending IV abx. Pt denies numbness, weakness, pain with extension, hx DM, trauma. Pt is right hand dominant. patient had some improvement initially but then the pain begins in am.. patient seen today by infectious disease doctor who changed to doxycycline to linezolid. patient seen by the orthopedic attending Dr. brumfield And she like to change antibiotics and continue doxycycline. per attending she will make the ID aware of the changes. stable for discharge home with po abx.

## 2024-06-10 NOTE — ED CDU PROVIDER DISPOSITION NOTE - PROVIDER TOKENS
PROVIDER:[TOKEN:[[id number]],FOLLOWUP:[1-3 Days]],PROVIDER:[TOKEN:[[id number]],FOLLOWUP:[4-6 Days]]

## 2024-06-10 NOTE — ED ADULT NURSE REASSESSMENT NOTE - NS ED NURSE REASSESS COMMENT FT1
Comfortable.  Right thumb dressing dry and intact.  Pt verbalized understanding of plan of care.  Awaiting Observation bed.
Comments: A 2 RN skin check has been performed on admission to observation with RN witness ISMAEL Reyna in ED supertrack.  A pressure injury _was not____ identified.  Documentation in the assessment to support findings.
PT A&OX4. Pt able to ambulate independently. PT receiving IV ABX, IV patent and intact. No complaints of pain or discomfort.
Pt resting comfortably on stretcher, tolerated dinner, medicated per md order. medicated with tylenol for discomfort.
Report received from Stefanie RN, care of pt assumed at 1230. pt received resting on stretcher, resp even and unlabored. . pt denies any new c/o at this time. pt updated on plan of care, questions asked and answered.
pt received in am, alert and oriented x4. awaiting re-eval from Obs team. VSS, will continue to monitor.
Assumed care of pt at this time. Pt A&O x 4 presents to ED with L thumb infection. Pt denies complaints at this time. No fever, chills or N/V. L thumb bandaged. Bed locked and in lowest position. Call bell within reach. Able to make needs known.

## 2024-06-10 NOTE — ED CDU PROVIDER DISPOSITION NOTE - CARE PROVIDER_API CALL
Devi Pedraza  Orthopaedic Surgery  [address] [place] [address]  Phone: [phone]  Fax: [phone]  Follow Up Time: 1-3 Days    Conchita Jon  Infectious Disease  [address] [place] [address]  Phone: [phone]  Fax: [phone]  Follow Up Time: 4-6 Days

## 2024-06-10 NOTE — ED CDU PROVIDER SUBSEQUENT DAY NOTE - ATTENDING APP SHARED VISIT CONTRIBUTION OF CARE
Patient was placed in observation for IV antibiotics after paronychia was drained.  Patient states he was initially improving but feels slightly worse today in terms of pain.  On exam right thumb noted to have erythema to inferior portion of nailbed, and there is tenderness and fluctuance as well as edema to the pulp of the right thumb.  Cap refill less than 2 seconds, patient able to range the thumb.  Patient is on Ancef 2 g every 8, will follow-up with orthopedics and infectious disease for further recommendations

## 2024-06-10 NOTE — PROGRESS NOTE ADULT - TIME BILLING
This includes chart review, patient assessment, discussion with ED team. Antibiotic dosing and management with clinical pharmacy.

## 2024-06-10 NOTE — ED CDU PROVIDER DISPOSITION NOTE - CARE PROVIDERS DIRECT ADDRESSES
,[email or web address]

## 2024-06-10 NOTE — PROGRESS NOTE ADULT - NS ATTEND AMEND GEN_ALL_CORE FT
Significant improvement in pain and rom, erythema still present but lessening, patient reports improvement and hoping to go home soon.  Spoke to ID and 24 hours of further IV antibiotics and discharge home with orals.  Follow up in office in 1 week.  WBC normal and no fevers.
Agree with PA note and plan as written - patient with significant improvement and reports he was stuck with shellfish recently and will worsened over the last few days.  At this time, patient ok to be discharged with oral doxycycline and will be seen in office in 1 week for further evaluation.

## 2024-06-10 NOTE — ED CDU PROVIDER SUBSEQUENT DAY NOTE - HISTORY
Pt remained stable. No acute events or complaints overnight
Pt resting comfortably at time of re-assessment. No events overnight. Pending IV abx.  Will continue to monitor.

## 2024-06-10 NOTE — ED CDU PROVIDER DISPOSITION NOTE - ATTENDING CONTRIBUTION TO CARE
I, Arlen Franco MD, have reviewed the ACP's documentation. After personally examining the patient, getting an independent history, and formulating an MDM, my findings have been added to this documentation as indicated.

## 2024-06-10 NOTE — ED CDU PROVIDER DISPOSITION NOTE - NSFOLLOWUPINSTRUCTIONS_ED_ALL_ED_FT
Continue antibiotics as prescribed ibuprofen and elevation  - please call and follow-up with orthopedic hand surgeon within a 2 or 3 days in office  - please call and follow-up with infectious disease doctor as well Continue antibiotics as prescribed ibuprofen and elevation  - please call and follow-up with orthopedic hand surgeon within a 2 or 3 days in office  - please call and follow-up with infectious disease doctor as well  -Do not stay in sunlight while you are taking doxycycline because of the reaction   Cellulitis, Adult       Cellulitis is a skin infection. The infected area is often warm, red, swollen, and sore. It occurs most often in the arms and lower legs. It is very important to get treated for this condition.    What are the causes?  This condition is caused by bacteria. The bacteria enter through a break in the skin, such as a cut, burn, insect bite, open sore, or crack.    What increases the risk?  This condition is more likely to occur in people who:    Have a weak body defense system (immune system).   Have open cuts, burns, bites, or scrapes on the skin.  Are older than 60 years of age.  Have a blood sugar problem (diabetes).   Have a long-lasting (chronic) liver disease (cirrhosis) or kidney disease.  Are very overweight (obese).  Have a skin problem, such as:    Itchy rash (eczema).  Slow movement of blood in the veins (venous stasis).  Fluid buildup below the skin (edema).  Have been treated with high-energy rays (radiation).  Use IV drugs.     What are the signs or symptoms?  Symptoms of this condition include:    Skin that is:    Red.  Streaking.  Spotting.  Swollen.  Sore or painful when you touch it.  Warm.  A fever.  Chills.   Blisters.    How is this diagnosed?  This condition is diagnosed based on:    Medical history.  Physical exam.  Blood tests.  Imaging tests.    How is this treated?  Treatment for this condition may include:    Medicines to treat infections or allergies.  Home care, such as:    Rest.  Placing cold or warm cloths (compresses) on the skin.  Hospital care, if the condition is very bad.    Follow these instructions at home:      Medicines    Take over-the-counter and prescription medicines only as told by your doctor.  If you were prescribed an antibiotic medicine, take it as told by your doctor. Do not stop taking it even if you start to feel better.        General instructions     Drink enough fluid to keep your pee (urine) pale yellow.  Do not touch or rub the infected area.  Raise (elevate) the infected area above the level of your heart while you are sitting or lying down.  Place cold or warm cloths on the area as told by your doctor.  Keep all follow-up visits as told by your doctor. This is important.    Contact a doctor if:  You have a fever.  You do not start to get better after 1–2 days of treatment.  Your bone or joint under the infected area starts to hurt after the skin has healed.  Your infection comes back. This can happen in the same area or another area.  You have a swollen bump in the area.  You have new symptoms.  You feel ill and have muscle aches and pains.    Get help right away if:  Your symptoms get worse.  You feel very sleepy.  You throw up (vomit) or have watery poop (diarrhea) for a long time.  You see red streaks coming from the area.  Your red area gets larger.  Your red area turns dark in color.    These symptoms may represent a serious problem that is an emergency. Do not wait to see if the symptoms will go away. Get medical help right away. Call your local emergency services (911 in the [place] Do not drive yourself to the hospital.    Summary  Cellulitis is a skin infection. The area is often warm, red, swollen, and sore.  This condition is treated with medicines, rest, and cold and warm cloths.  Take all medicines only as told by your doctor.  Tell your doctor if symptoms do not start to get better after 1–2 days of treatment.    ADDITIONAL NOTES AND INSTRUCTIONS    Please follow up with your Primary MD in 24-48 hr.  Seek immediate medical care for any new/worsening signs or symptoms.

## 2024-06-10 NOTE — PROGRESS NOTE ADULT - ASSESSMENT
43y  Male with no past medical history. Patient presented to the ER on 6/8 with swelling and pain to the  thumb on right hand. Patient states that this started 2 days prior to presentation. Patient reports he felt that he had gotten some  metal shavings into the thumb.  He went to urgent care, was given doxycycline, and told to come to the ED if his symptoms are worse.  He came to the ER on 6/8 and underwent an I&D by the ED. Started on Doxycycline and Cefazolin. Seen by hand surgery. ID input requested.      Rt thumb infection  Leukocytosis       - Blood cultures no growth   - Abscess cultures no growth   - Hand Xr with no acute findings  - Unclear cause of infection, ? metal shavings  - s/pTDaP vaccine  - Continue Cefazolin  - D/C  Doxycycline   - Start Linezolid 600mg [id number]  - Patient advised to avoid Cheese and Wine while on linezolid  - Patient is not on MAOI's or SSRI's   - Will monitor Serotonin syndrome while on Linezolid  - Will need total 7 days of Linezolid till 6/16/24  - Follow up cultures  - Trend Fever  - Trend WBC      Thank you for allowing me to participate in the care of your patient.   Will Follow    Discussed treatment plan with: ED team

## 2024-06-10 NOTE — PROGRESS NOTE ADULT - SUBJECTIVE AND OBJECTIVE BOX
[facility]  INFECTIOUS DISEASES at [place] / [place] / [place]  =======================================================                               Juan Arndt MD#   Conchita Jon MD*                             Meredith Mayers MD*   Sasha Hernandez MD*                              Professor Emeritus:  Dr Evangelist Encarnacion MD^            Diplomates American Board of Internal Medicine & Infectious Diseases                # [place] Office - Appt - Tel  [phone] Fax [phone]                * [place] Office - Appt - Tel [phone] Fax [phone]                      ^[place] Office - Tel  [phone] Fax [phone]                                  Hospital Consult line:  [phone]  =======================================================    STEFANO HARRISON [id number]    Follow up: Rt thumb infection    No fevers   + Thumb pain       Allergies:  No Known Allergies      REVIEW OF SYSTEMS:  CONSTITUTIONAL:  No Fever or chills  HEENT:  No diplopia or blurred vision.  No earache, sore throat or runny nose.  CARDIOVASCULAR:  No chest pain  RESPIRATORY:  No cough, shortness of breath  GASTROINTESTINAL:  No nausea, vomiting or diarrhea.  GENITOURINARY:  No dysuria, frequency or urgency. No Blood in urine  MUSCULOSKELETAL:  no joint aches, no muscle pain  SKIN:  No change in skin, hair or nails.  NEUROLOGIC:  No Headaches      Physical Exam:  GEN: NAD  HEENT: normocephalic and atraumatic. EOMI. PERRL.    NECK: Supple.   LUNGS: CTA B/L.  HEART: RRR  ABDOMEN: Soft, NT, ND.  +BS.    : No CVA tenderness  EXTREMITIES: Without  edema.  MSK: No joint swelling  NEUROLOGIC: No Focal Deficits   SKIN: Thumb in dressing, has ROM now        Vitals:  T(F): 97.9 (10 Nathanael 2024 07:52), Max: 98 (09 Jun 2024 15:37)  HR: 57 (10 Nathanael 2024 07:52)  BP: 127/80 (10 Nathanael 2024 07:52)  RR: 18 (10 Nathanael 2024 07:52)  SpO2: 98% (10 Nathanael 2024 07:52) (96% - 98%)  temp max in last 48H T(F): , Max: 98.9 (06-08-24 @ 18:21)      Current Antibiotics:  ceFAZolin  Injectable. 2000 milliGRAM(s) IV Push every 8 hours  doxycycline IVPB 100 milliGRAM(s) IV Intermittent every 12 hours    Other medications:  ketorolac   Injectable 15 milliGRAM(s) IV Push every 6 hours                            14.0   9.96  )-----------( 176      ( 09 Jun 2024 05:46 )             39.1     06-09    137  |  105  |  16.3  ----------------------------<  145<H>  4.1   |  20.0<L>  |  0.83    Ca    8.8      09 Jun 2024 05:46    TPro  7.0  /  Alb  3.9  /  TBili  0.4  /  DBili  x   /  AST  17  /  ALT  18  /  AlkPhos  54  06-09    RECENT CULTURES:  06-08 @ 16:28 .Abscess Arm - Right     No growth  No polymorphonuclear leukocytes seen per low power field  No organisms seen per oil power field    06-08 @ 16:15 .Blood Blood-Peripheral     No growth at 24 hours    06-08 @ 16:10 .Blood Blood-Peripheral     No growth at 24 hours      WBC Count: 9.96 K/uL (06-09-24 @ 05:46)  WBC Count: 11.26 K/uL (06-08-24 @ 16:10)    Creatinine: 0.83 mg/dL (06-09-24 @ 05:46)  Creatinine: 0.75 mg/dL (06-08-24 @ 16:10)

## 2024-06-10 NOTE — ED CDU PROVIDER SUBSEQUENT DAY NOTE - PHYSICAL EXAMINATION
Gen: No acute distress, non toxic  Head: NCAT  Eyes: pink conjunctivae, EOMI, PERRL  Neuro: A&O x 3, sensorimotor intact without deficits   MSK: right thumb edematous and swollen, with induration and fluctuance present over proximal aspect of nailbed, limited range of motion of right thumb secondary to pain and swelling, no streaking/crepitus. no pain with passive extension.   Vasc: Radial pulses 2+ b/l  Skin: see MSK above
Physical Examination:   Gen: NAD, AOx3  Head: NCAT  HEENT: EOMI, oral mucosa moist, normal conjunctiva, neck supple  Lung: no respiratory distress  CV:  Normal perfusion  Abd: soft, NT ND  MSK: No edema, no visible deformities + redness swelling to distal right 1st digit   Neuro: No focal neurologic deficits  Skin: No rash   Psych: normal affect

## 2024-06-10 NOTE — PROGRESS NOTE ADULT - SUBJECTIVE AND OBJECTIVE BOX
STEFANO HARRISON    [id number]    History:  The patient is being followed for non-operative management of right hand/ thumb hand cellulitis with paronychia. I/D performed by ER 6/8.  The patient reports improved condition yesterday, but today pain is moderate with significant decrease in swelling and improved ROM. Denies nausea, vomiting, chest pain, shortness of breath, abdominal pain or fever. No new complaints. No acute motor or sensory changes are reported.    Vital Signs Last 24 Hrs  T(C): 36.6 (10 Nathanael 2024 07:52), Max: 36.7 (09 Jun 2024 15:37)  T(F): 97.9 (10 Nathanael 2024 07:52), Max: 98 (09 Jun 2024 15:37)  HR: 57 (10 Nathanael 2024 07:52) (57 - 71)  BP: 127/80 (10 Nathanael 2024 07:52) (127/80 - 137/78)  BP(mean): --  RR: 18 (10 Nathanael 2024 07:52) (16 - 18)  SpO2: 98% (10 Nathanael 2024 07:52) (96% - 98%)    Parameters below as of 10 Nathanael 2024 07:52  Patient On (Oxygen Delivery Method): room air                              14.0   9.96  )-----------( 176      ( 09 Jun 2024 05:46 )             39.1     06-09    137  |  105  |  16.3  ----------------------------<  145<H>  4.1   |  20.0<L>  |  0.83    Ca    8.8      09 Jun 2024 05:46    TPro  7.0  /  Alb  3.9  /  TBili  0.4  /  DBili  x   /  AST  17  /  ALT  18  /  AlkPhos  54  06-09      MEDICATIONS  (STANDING):  ceFAZolin  Injectable. 2000 milliGRAM(s) IV Push every 8 hours  ketorolac   Injectable 15 milliGRAM(s) IV Push every 6 hours  linezolid    Tablet 600 milliGRAM(s) Oral every 12 hours    MEDICATIONS  (PRN):  acetaminophen     Tablet .. 650 milliGRAM(s) Oral every 6 hours PRN Temp greater or equal to 38C (100.4F), Mild Pain (1 - 3)      Physical exam: There is minimal erythema of the affected area of thumb. This appears to be clinically improved.  There is minimum tenderness of the thumb, volar pad. No fluctuance. No drainage or discharge. No proximal streaking or spreading is noted. Compartments are soft. Sensation to light touch is grossly intact without focal deficit and is symmetric bilaterally. Motion is mildly limited as a result of pain. No focal motor weaknesses are appreciated. 2+ radial pulse. Capillary refill is less than 2 seconds. No cyanosis..    Primary Orthopedic Assessment:  • hand cellulitis/ paronychia ID 6/8    Secondary  Orthopedic Assessment(s):   •     Secondary  Medical Assessment(s):   •     Plan:   • DVT prophylaxis as prescribed  • Continue IV antibiotics   • Elevation of the affected extremity  • Pain control as clinically indicated  • Discussed with Dr Greene   STEFANO HARRISON    [id number]    History:  The patient is being followed for non-operative management of right hand/ thumb hand cellulitis with paronychia. I/D performed by ER 6/8.  The patient reports improved condition yesterday, but today pain is moderate with significant decrease in swelling and improved ROM. Denies nausea, vomiting, chest pain, shortness of breath, abdominal pain or fever. No new complaints. No acute motor or sensory changes are reported.    Vital Signs Last 24 Hrs  T(C): 36.6 (10 Nathanael 2024 07:52), Max: 36.7 (09 Jun 2024 15:37)  T(F): 97.9 (10 Nathanael 2024 07:52), Max: 98 (09 Jun 2024 15:37)  HR: 57 (10 Nathanael 2024 07:52) (57 - 71)  BP: 127/80 (10 Nathanael 2024 07:52) (127/80 - 137/78)  BP(mean): --  RR: 18 (10 Nathanael 2024 07:52) (16 - 18)  SpO2: 98% (10 Nathanael 2024 07:52) (96% - 98%)    Parameters below as of 10 Nathanael 2024 07:52  Patient On (Oxygen Delivery Method): room air                              14.0   9.96  )-----------( 176      ( 09 Jun 2024 05:46 )             39.1     06-09    137  |  105  |  16.3  ----------------------------<  145<H>  4.1   |  20.0<L>  |  0.83    Ca    8.8      09 Jun 2024 05:46    TPro  7.0  /  Alb  3.9  /  TBili  0.4  /  DBili  x   /  AST  17  /  ALT  18  /  AlkPhos  54  06-09      MEDICATIONS  (STANDING):  ceFAZolin  Injectable. 2000 milliGRAM(s) IV Push every 8 hours  ketorolac   Injectable 15 milliGRAM(s) IV Push every 6 hours  linezolid    Tablet 600 milliGRAM(s) Oral every 12 hours    MEDICATIONS  (PRN):  acetaminophen     Tablet .. 650 milliGRAM(s) Oral every 6 hours PRN Temp greater or equal to 38C (100.4F), Mild Pain (1 - 3)      Physical exam: There is minimal erythema of the affected area of thumb. This appears to be clinically improved.  There is minimum tenderness of the thumb, volar pad. No fluctuance. No drainage or discharge. No proximal streaking or spreading is noted. Compartments are soft. Sensation to light touch is grossly intact without focal deficit and is symmetric bilaterally. Motion is mildly limited as a result of pain. No focal motor weaknesses are appreciated. 2+ radial pulse. Capillary refill is less than 2 seconds. No cyanosis..    Primary Orthopedic Assessment:  • hand cellulitis/ paronychia ID 6/8    Secondary  Orthopedic Assessment(s):   •     Secondary  Medical Assessment(s):   •     Plan:   • DVT prophylaxis as prescribed  • Continue IV antibiotics   • Elevation of the affected extremity  • Pain control as clinically indicated  • Discussed with Dr Greene  Patient has improved with antibiotics  Discharge to home with ID recs for ABX  Follow up in the office with Dr Greene

## 2024-06-10 NOTE — ED CDU PROVIDER DISPOSITION NOTE - PATIENT PORTAL LINK FT
You can access the FollowMyHealth Patient Portal offered by [facility] by registering at the following website: [email or web address]. By joining [facility]’s FollowMyHealth portal, you will also be able to view your health information using other applications (apps) compatible with our system.

## 2024-06-13 LAB
CULTURE RESULTS: ABNORMAL
GRAM STN FLD: ABNORMAL
SPECIMEN SOURCE: SIGNIFICANT CHANGE UP

## 2024-06-14 LAB
CULTURE RESULTS: SIGNIFICANT CHANGE UP
CULTURE RESULTS: SIGNIFICANT CHANGE UP
SPECIMEN SOURCE: SIGNIFICANT CHANGE UP
SPECIMEN SOURCE: SIGNIFICANT CHANGE UP

## 2024-06-21 ENCOUNTER — APPOINTMENT (OUTPATIENT)
Dept: INTERNAL MEDICINE | Facility: CLINIC | Age: 44
End: 2024-06-21
Payer: COMMERCIAL

## 2024-06-21 VITALS
WEIGHT: 198 LBS | SYSTOLIC BLOOD PRESSURE: 129 MMHG | HEART RATE: 56 BPM | OXYGEN SATURATION: 100 % | DIASTOLIC BLOOD PRESSURE: 83 MMHG | TEMPERATURE: 97.6 F

## 2024-06-21 DIAGNOSIS — L08.9 LOCAL INFECTION OF THE SKIN AND SUBCUTANEOUS TISSUE, UNSPECIFIED: ICD-10-CM

## 2024-06-21 PROCEDURE — 99214 OFFICE O/P EST MOD 30 MIN: CPT

## 2024-06-21 RX ORDER — LINEZOLID 600 MG/1
600 TABLET, FILM COATED ORAL
Qty: 14 | Refills: 0 | Status: ACTIVE | COMMUNITY
Start: 2024-06-21 | End: 1900-01-01

## 2024-06-21 NOTE — ASSESSMENT
[FreeTextEntry1] : 43 year-old male with rt thumb infection  Right thumb infection   Recommendations Completed Doxycycline.  Start Linezolid 600mg PO q 12hours x 7days Patient advised to avoid Cheese and Wine while on linezolid Patient is not on MAOI's or SSRI's   Follow up in 2 weeks    [Treatment Education] : treatment education [Treatment Adherence] : treatment adherence [Rx Dose / Side Effects] : Rx dose/side effects [Drug Interactions / Side Effects] : drug interactions/side effects [Disclosure of Diagnosis] : disclosure of diagnosis

## 2024-06-21 NOTE — PHYSICAL EXAM
[General Appearance - Alert] : alert [General Appearance - In No Acute Distress] : in no acute distress [Sclera] : the sclera and conjunctiva were normal [Outer Ear] : the ears and nose were normal in appearance [Neck Appearance] : the appearance of the neck was normal [] : no respiratory distress [Exaggerated Use Of Accessory Muscles For Inspiration] : no accessory muscle use [Edema] : there was no peripheral edema [Bowel Sounds] : normal bowel sounds [Abdomen Soft] : soft [Musculoskeletal - Swelling] : no joint swelling [Motor Exam] : the motor exam was normal [No Focal Deficits] : no focal deficits [Oriented To Time, Place, And Person] : oriented to person, place, and time [Affect] : the affect was normal [FreeTextEntry1] : Rt thumb swelling, improved erythema.

## 2024-06-21 NOTE — HISTORY OF PRESENT ILLNESS
[FreeTextEntry1] : 43-year-old  Male with no past medical history. Patient presented to the ER on 6/8 with swelling and pain to the  thumb on right hand. This had started 2 days prior to presentation. Patient reports he felt that he had gotten some  metal shavings into the thumb.  He went to urgent care, was given doxycycline, and told to come to the ED if his symptoms are worse.  He came to the ER on 6/8 and underwent an I&D by the ED. Started on Doxycycline and Cefazolin. Seen by hand surgery. During his ER stay he was seen by ID and Was on Doxycycline and Cefazolin, Blood cultures no growth, Abscess cultures no growth, Hand Xr with no acute findings. He was administered TDaP vaccination. Discharged on 6/10/24 on Doxycycline. Patient presents for follow up today. Reports swelling still present, ROM is improved. No fevers or chills.
